# Patient Record
Sex: FEMALE | Race: WHITE | NOT HISPANIC OR LATINO | Employment: STUDENT | URBAN - METROPOLITAN AREA
[De-identification: names, ages, dates, MRNs, and addresses within clinical notes are randomized per-mention and may not be internally consistent; named-entity substitution may affect disease eponyms.]

---

## 2018-11-06 ENCOUNTER — OFFICE VISIT (OUTPATIENT)
Dept: FAMILY MEDICINE CLINIC | Facility: CLINIC | Age: 18
End: 2018-11-06
Payer: COMMERCIAL

## 2018-11-06 VITALS
OXYGEN SATURATION: 98 % | DIASTOLIC BLOOD PRESSURE: 80 MMHG | TEMPERATURE: 98 F | RESPIRATION RATE: 16 BRPM | WEIGHT: 219.6 LBS | HEART RATE: 106 BPM | SYSTOLIC BLOOD PRESSURE: 120 MMHG

## 2018-11-06 DIAGNOSIS — D50.8 OTHER IRON DEFICIENCY ANEMIA: ICD-10-CM

## 2018-11-06 DIAGNOSIS — Z30.011 ENCOUNTER FOR PRESCRIPTION OF ORAL CONTRACEPTIVES: ICD-10-CM

## 2018-11-06 DIAGNOSIS — J35.1 ENLARGED TONSILS: ICD-10-CM

## 2018-11-06 DIAGNOSIS — J45.20 MILD INTERMITTENT ASTHMA WITHOUT COMPLICATION: Primary | ICD-10-CM

## 2018-11-06 PROCEDURE — 99214 OFFICE O/P EST MOD 30 MIN: CPT | Performed by: FAMILY MEDICINE

## 2018-11-06 RX ORDER — LEVONORGESTREL AND ETHINYL ESTRADIOL 0.15-0.03
KIT ORAL EVERY 24 HOURS
COMMUNITY
Start: 2018-02-27 | End: 2018-11-06 | Stop reason: SDUPTHER

## 2018-11-06 RX ORDER — FLUTICASONE PROPIONATE 110 UG/1
2 AEROSOL, METERED RESPIRATORY (INHALATION) 2 TIMES DAILY
Qty: 1 INHALER | Refills: 3 | Status: SHIPPED | OUTPATIENT
Start: 2018-11-06 | End: 2018-12-27 | Stop reason: ALTCHOICE

## 2018-11-06 RX ORDER — BUPROPION HYDROCHLORIDE 150 MG/1
150 TABLET ORAL EVERY MORNING
Refills: 5 | COMMUNITY
Start: 2018-10-11

## 2018-11-06 RX ORDER — FLUOXETINE 10 MG/1
10 CAPSULE ORAL EVERY MORNING
Refills: 5 | COMMUNITY
Start: 2018-10-11

## 2018-11-06 RX ORDER — LEVONORGESTREL AND ETHINYL ESTRADIOL 0.15-0.03
1 KIT ORAL EVERY 24 HOURS
Qty: 28 TABLET | Refills: 5 | Status: SHIPPED | OUTPATIENT
Start: 2018-11-06

## 2018-11-06 RX ORDER — DEXTROAMPHETAMINE SACCHARATE, AMPHETAMINE ASPARTATE, DEXTROAMPHETAMINE SULFATE AND AMPHETAMINE SULFATE 7.5; 7.5; 7.5; 7.5 MG/1; MG/1; MG/1; MG/1
1 TABLET ORAL 2 TIMES DAILY
Refills: 0 | COMMUNITY
Start: 2018-10-14

## 2018-11-06 RX ORDER — FLUTICASONE PROPIONATE 50 MCG
SPRAY, SUSPENSION (ML) NASAL EVERY 12 HOURS
COMMUNITY
Start: 2018-02-27 | End: 2018-12-10 | Stop reason: SDUPTHER

## 2018-11-06 RX ORDER — LORATADINE 10 MG/1
TABLET ORAL EVERY 24 HOURS
COMMUNITY
Start: 2018-02-27 | End: 2018-11-16 | Stop reason: ALTCHOICE

## 2018-11-06 RX ORDER — NEOMYCIN SULFATE, POLYMYXIN B SULFATE AND HYDROCORTISONE 10; 3.5; 1 MG/ML; MG/ML; [USP'U]/ML
SUSPENSION/ DROPS AURICULAR (OTIC)
Refills: 0 | COMMUNITY
Start: 2018-09-13 | End: 2018-11-06

## 2018-11-06 NOTE — PROGRESS NOTES
Assessment/Plan:    No problem-specific Assessment & Plan notes found for this encounter  Diagnoses and all orders for this visit:    Mild intermittent asthma without complication  Comments:  peak flow in office was 210 L/min    start fluticasone inhaler daily, follow up in 2 weeks   continue ventolin as needed   Orders:  -     fluticasone (FLOVENT HFA) 110 MCG/ACT inhaler; Inhale 2 puffs 2 (two) times a day Rinse mouth after use  Enlarged tonsils  Comments:  extensive history of strep throat   referral to ENT to discuss possibility of tonsillectomy   Orders:  -     Ambulatory Referral to Otolaryngology; Future    Encounter for prescription of oral contraceptives  -     levonorgestrel-ethinyl estradiol (ALTAVERA) 0 15-30 MG-MCG per tablet; Take 1 tablet by mouth every 24 hours    Other orders  -     VENTOLIN  (90 Base) MCG/ACT inhaler; TAKE 2 PUFFS BY MOUTH EVERY 4 TO 6 HOURS AS NEEDED FOR BREATHING  -     Discontinue: levonorgestrel-ethinyl estradiol (ALTAVERA) 0 15-30 MG-MCG per tablet; every 24 hours  -     amphetamine-dextroamphetamine (ADDERALL) 30 MG tablet; Take 1 tablet by mouth 2 (two) times a day  -     buPROPion (WELLBUTRIN XL) 150 mg 24 hr tablet; Take 150 mg by mouth every morning  -     FLUoxetine (PROzac) 10 mg capsule; Take 10 mg by mouth every morning  -     fluticasone (FLONASE) 50 mcg/act nasal spray; Every 12 hours  -     loratadine (CLARITIN) 10 mg tablet; every 24 hours  -     Discontinue: neomycin-polymyxin-hydrocortisone (CORTISPORIN) 0 35%-10,000 units/mL-1% otic suspension; INSTILL 4 DROPS IN AFFECTED EAR(S) 4 TIMES A DAY FOR 5-7 DAYS          Subjective:      Patient ID: Percy Rodriguez is a 19 y o  female  JUMA VELIZ  Is a 18 yo F who presents to the office with complaint of "tightness when exhaling" x1 year  Patient states that she experiences a feeling of tightness in her chest with exertion and sometimes randomly at rest as well   She states this has been occurring about 3-4x/week over the last year  She was not taking anything for this until last week when she went to urgent care for bronchitis  At that time they prescribed Ventolin inhaler  She has since been using the inhaler 4x/day over the last week  She states the inhaler helps with her symptoms  She denies any PMH of asthma  Additionally, patient is asking about possibility of tonsillectomy because she has an extensive history of strep throat  She states she has had 3 documented cases of strep throat in the past year  Patient denies snoring, chest pain, dizziness, lightheadedness  The following portions of the patient's history were reviewed and updated as appropriate:   She  has a past medical history of ADHD; Asthma; and Depression  She There are no active problems to display for this patient  Current Outpatient Prescriptions   Medication Sig Dispense Refill    amphetamine-dextroamphetamine (ADDERALL) 30 MG tablet Take 1 tablet by mouth 2 (two) times a day  0    buPROPion (WELLBUTRIN XL) 150 mg 24 hr tablet Take 150 mg by mouth every morning  5    FLUoxetine (PROzac) 10 mg capsule Take 10 mg by mouth every morning  5    fluticasone (FLONASE) 50 mcg/act nasal spray Every 12 hours      levonorgestrel-ethinyl estradiol (ALTAVERA) 0 15-30 MG-MCG per tablet Take 1 tablet by mouth every 24 hours 28 tablet 5    loratadine (CLARITIN) 10 mg tablet every 24 hours      VENTOLIN  (90 Base) MCG/ACT inhaler TAKE 2 PUFFS BY MOUTH EVERY 4 TO 6 HOURS AS NEEDED FOR BREATHING  0    fluticasone (FLOVENT HFA) 110 MCG/ACT inhaler Inhale 2 puffs 2 (two) times a day Rinse mouth after use  1 Inhaler 3     No current facility-administered medications for this visit  She is allergic to no active allergies       Review of Systems   Constitutional: Negative  HENT: Negative  Eyes: Negative  Respiratory: Positive for chest tightness (with exertion )  Cardiovascular: Negative  Gastrointestinal: Negative  Genitourinary: Negative  Psychiatric/Behavioral: Negative  Objective:      /80 (BP Location: Left arm, Patient Position: Sitting, Cuff Size: Large)   Pulse (!) 106   Temp 98 °F (36 7 °C) (Tympanic)   Resp 16   Wt 99 6 kg (219 lb 9 6 oz)   SpO2 98%          Physical Exam   Constitutional: She is oriented to person, place, and time  She appears well-developed  HENT:   Head: Normocephalic  Mouth/Throat: Oropharynx is clear and moist    Enlarged tonsils bilaterally    Neck: Normal range of motion  Cardiovascular: Normal rate and regular rhythm  Pulmonary/Chest: Effort normal and breath sounds normal    Peak flow 210 L/min    Abdominal: Soft  Bowel sounds are normal    Neurological: She is alert and oriented to person, place, and time  Skin: Skin is warm  Psychiatric: She has a normal mood and affect

## 2018-11-08 ENCOUNTER — APPOINTMENT (OUTPATIENT)
Dept: LAB | Facility: IMAGING CENTER | Age: 18
End: 2018-11-08
Payer: COMMERCIAL

## 2018-11-08 ENCOUNTER — TRANSCRIBE ORDERS (OUTPATIENT)
Dept: ADMINISTRATIVE | Facility: HOSPITAL | Age: 18
End: 2018-11-08

## 2018-11-08 DIAGNOSIS — D50.8 IRON DEFICIENCY ANEMIA SECONDARY TO INADEQUATE DIETARY IRON INTAKE: ICD-10-CM

## 2018-11-08 DIAGNOSIS — Z30.011 OCP (ORAL CONTRACEPTIVE PILLS) INITIATION: ICD-10-CM

## 2018-11-08 DIAGNOSIS — Z30.011 OCP (ORAL CONTRACEPTIVE PILLS) INITIATION: Primary | ICD-10-CM

## 2018-11-08 LAB
ALBUMIN SERPL BCP-MCNC: 3.6 G/DL (ref 3.5–5)
ALP SERPL-CCNC: 88 U/L (ref 46–116)
ALT SERPL W P-5'-P-CCNC: 18 U/L (ref 12–78)
ANION GAP SERPL CALCULATED.3IONS-SCNC: 4 MMOL/L (ref 4–13)
AST SERPL W P-5'-P-CCNC: 11 U/L (ref 5–45)
BASOPHILS # BLD AUTO: 0.03 THOUSANDS/ΜL (ref 0–0.1)
BASOPHILS NFR BLD AUTO: 0 % (ref 0–1)
BILIRUB SERPL-MCNC: 0.42 MG/DL (ref 0.2–1)
BUN SERPL-MCNC: 15 MG/DL (ref 5–25)
CALCIUM SERPL-MCNC: 9.1 MG/DL (ref 8.3–10.1)
CHLORIDE SERPL-SCNC: 105 MMOL/L (ref 100–108)
CO2 SERPL-SCNC: 27 MMOL/L (ref 21–32)
CREAT SERPL-MCNC: 0.88 MG/DL (ref 0.6–1.3)
EOSINOPHIL # BLD AUTO: 0.09 THOUSAND/ΜL (ref 0–0.61)
EOSINOPHIL NFR BLD AUTO: 1 % (ref 0–6)
ERYTHROCYTE [DISTWIDTH] IN BLOOD BY AUTOMATED COUNT: 12.2 % (ref 11.6–15.1)
GFR SERPL CREATININE-BSD FRML MDRD: 95 ML/MIN/1.73SQ M
GLUCOSE P FAST SERPL-MCNC: 85 MG/DL (ref 65–99)
HCT VFR BLD AUTO: 40.7 % (ref 34.8–46.1)
HGB BLD-MCNC: 12.9 G/DL (ref 11.5–15.4)
IMM GRANULOCYTES # BLD AUTO: 0.06 THOUSAND/UL (ref 0–0.2)
IMM GRANULOCYTES NFR BLD AUTO: 1 % (ref 0–2)
LYMPHOCYTES # BLD AUTO: 2.47 THOUSANDS/ΜL (ref 0.6–4.47)
LYMPHOCYTES NFR BLD AUTO: 30 % (ref 14–44)
MCH RBC QN AUTO: 28.5 PG (ref 26.8–34.3)
MCHC RBC AUTO-ENTMCNC: 31.7 G/DL (ref 31.4–37.4)
MCV RBC AUTO: 90 FL (ref 82–98)
MONOCYTES # BLD AUTO: 0.49 THOUSAND/ΜL (ref 0.17–1.22)
MONOCYTES NFR BLD AUTO: 6 % (ref 4–12)
NEUTROPHILS # BLD AUTO: 5.1 THOUSANDS/ΜL (ref 1.85–7.62)
NEUTS SEG NFR BLD AUTO: 62 % (ref 43–75)
NRBC BLD AUTO-RTO: 0 /100 WBCS
PLATELET # BLD AUTO: 405 THOUSANDS/UL (ref 149–390)
PMV BLD AUTO: 10.5 FL (ref 8.9–12.7)
POTASSIUM SERPL-SCNC: 4.4 MMOL/L (ref 3.5–5.3)
PROT SERPL-MCNC: 7.9 G/DL (ref 6.4–8.2)
RBC # BLD AUTO: 4.52 MILLION/UL (ref 3.81–5.12)
SODIUM SERPL-SCNC: 136 MMOL/L (ref 136–145)
TSH SERPL DL<=0.05 MIU/L-ACNC: 2.26 UIU/ML (ref 0.46–3.98)
WBC # BLD AUTO: 8.24 THOUSAND/UL (ref 4.31–10.16)

## 2018-11-08 PROCEDURE — 80053 COMPREHEN METABOLIC PANEL: CPT

## 2018-11-08 PROCEDURE — 84443 ASSAY THYROID STIM HORMONE: CPT

## 2018-11-08 PROCEDURE — 85025 COMPLETE CBC W/AUTO DIFF WBC: CPT

## 2018-11-08 PROCEDURE — 36415 COLL VENOUS BLD VENIPUNCTURE: CPT

## 2018-11-16 ENCOUNTER — HOSPITAL ENCOUNTER (OUTPATIENT)
Dept: RADIOLOGY | Facility: IMAGING CENTER | Age: 18
Discharge: HOME | End: 2018-11-16
Payer: COMMERCIAL

## 2018-11-16 ENCOUNTER — TRANSCRIBE ORDERS (OUTPATIENT)
Dept: ADMINISTRATIVE | Facility: HOSPITAL | Age: 18
End: 2018-11-16

## 2018-11-16 ENCOUNTER — OFFICE VISIT (OUTPATIENT)
Dept: FAMILY MEDICINE CLINIC | Facility: CLINIC | Age: 18
End: 2018-11-16
Payer: COMMERCIAL

## 2018-11-16 VITALS
DIASTOLIC BLOOD PRESSURE: 70 MMHG | SYSTOLIC BLOOD PRESSURE: 120 MMHG | HEIGHT: 65 IN | TEMPERATURE: 98.9 F | BODY MASS INDEX: 37.95 KG/M2 | OXYGEN SATURATION: 97 % | RESPIRATION RATE: 16 BRPM | WEIGHT: 227.8 LBS | HEART RATE: 100 BPM

## 2018-11-16 DIAGNOSIS — M54.6 ACUTE LEFT-SIDED THORACIC BACK PAIN: ICD-10-CM

## 2018-11-16 DIAGNOSIS — M54.6 ACUTE LEFT-SIDED THORACIC BACK PAIN: Primary | ICD-10-CM

## 2018-11-16 PROCEDURE — 72072 X-RAY EXAM THORAC SPINE 3VWS: CPT

## 2018-11-16 PROCEDURE — 3008F BODY MASS INDEX DOCD: CPT | Performed by: FAMILY MEDICINE

## 2018-11-16 PROCEDURE — 99214 OFFICE O/P EST MOD 30 MIN: CPT | Performed by: FAMILY MEDICINE

## 2018-11-16 RX ORDER — CEPHALEXIN 500 MG/1
TABLET ORAL EVERY 6 HOURS
COMMUNITY
Start: 2018-02-27 | End: 2018-11-16 | Stop reason: ALTCHOICE

## 2018-11-16 RX ORDER — NORETHINDRONE ACETATE AND ETHINYL ESTRADIOL .03; 1.5 MG/1; MG/1
1 TABLET ORAL
COMMUNITY
Start: 2015-12-07 | End: 2018-11-16 | Stop reason: ALTCHOICE

## 2018-11-16 RX ORDER — NAPROXEN 500 MG/1
500 TABLET ORAL 2 TIMES DAILY WITH MEALS
Qty: 60 TABLET | Refills: 0 | Status: SHIPPED | OUTPATIENT
Start: 2018-11-16 | End: 2018-12-27 | Stop reason: ALTCHOICE

## 2018-11-16 RX ORDER — CLINDAMYCIN AND BENZOYL PEROXIDE 10; 50 MG/G; MG/G
GEL TOPICAL
COMMUNITY
Start: 2015-08-13 | End: 2018-11-16 | Stop reason: ALTCHOICE

## 2018-11-16 RX ORDER — BACLOFEN 10 MG/1
10 TABLET ORAL 3 TIMES DAILY
Qty: 30 TABLET | Refills: 0 | Status: SHIPPED | OUTPATIENT
Start: 2018-11-16 | End: 2018-12-10 | Stop reason: ALTCHOICE

## 2018-11-16 NOTE — PROGRESS NOTES
Assessment/Plan:     Diagnoses and all orders for this visit:    Acute left-sided thoracic back pain  Comments:  She was given prescription for naproxen and baclofen  I am going to check x-ray  Orders:  -     XR spine thoracic 3 vw; Future  -     naproxen (NAPROSYN) 500 mg tablet; Take 1 tablet (500 mg total) by mouth 2 (two) times a day with meals  -     baclofen 10 mg tablet; Take 1 tablet (10 mg total) by mouth 3 (three) times a day    Other orders  -     Discontinue: Cephalexin 500 MG tablet; every 6 (six) hours  -     Discontinue: clindamycin-benzoyl peroxide (BENZACLIN) gel; Apply topically  -     Discontinue: Norethindrone Acet-Ethinyl Est 1 5-30 MG-MCG TABS; Take 1 tablet by mouth          There are no Patient Instructions on file for this visit  Return if symptoms worsen or fail to improve  Subjective:      Patient ID: Juanice Holstein is a 19 y o  female  Chief Complaint   Patient presents with    Back Pain     left middle back pain       Back Pain   This is a new problem  The current episode started today  The problem occurs constantly  The problem has been gradually worsening since onset  The pain is present in the thoracic spine  The quality of the pain is described as stabbing  The pain does not radiate  The pain is at a severity of 6/10  The pain is the same all the time  The symptoms are aggravated by bending, position and twisting  Pertinent negatives include no abdominal pain, chest pain, dysuria, fever, headaches, leg pain, tingling or weakness  Risk factors include obesity and lack of exercise  The following portions of the patient's history were reviewed and updated as appropriate: allergies, current medications, past family history, past medical history, past social history, past surgical history and problem list     Review of Systems   Constitutional: Negative for chills and fever  HENT: Negative for trouble swallowing  Eyes: Negative for visual disturbance     Respiratory: Negative for cough and shortness of breath  Cardiovascular: Negative for chest pain, palpitations and leg swelling  Gastrointestinal: Negative for abdominal pain, constipation and diarrhea  Endocrine: Negative for cold intolerance and heat intolerance  Genitourinary: Negative for difficulty urinating and dysuria  Musculoskeletal: Positive for back pain  Negative for gait problem  Skin: Negative for rash  Neurological: Negative for dizziness, tingling, tremors, seizures, weakness and headaches  Hematological: Negative for adenopathy  Psychiatric/Behavioral: Negative for behavioral problems  Current Outpatient Prescriptions   Medication Sig Dispense Refill    amphetamine-dextroamphetamine (ADDERALL) 30 MG tablet Take 1 tablet by mouth 2 (two) times a day  0    buPROPion (WELLBUTRIN XL) 150 mg 24 hr tablet Take 150 mg by mouth every morning  5    FLUoxetine (PROzac) 10 mg capsule Take 10 mg by mouth every morning  5    fluticasone (FLONASE) 50 mcg/act nasal spray Every 12 hours      fluticasone (FLOVENT HFA) 110 MCG/ACT inhaler Inhale 2 puffs 2 (two) times a day Rinse mouth after use  1 Inhaler 3    levonorgestrel-ethinyl estradiol (ALTAVERA) 0 15-30 MG-MCG per tablet Take 1 tablet by mouth every 24 hours 28 tablet 5    VENTOLIN  (90 Base) MCG/ACT inhaler TAKE 2 PUFFS BY MOUTH EVERY 4 TO 6 HOURS AS NEEDED FOR BREATHING  0    baclofen 10 mg tablet Take 1 tablet (10 mg total) by mouth 3 (three) times a day 30 tablet 0    naproxen (NAPROSYN) 500 mg tablet Take 1 tablet (500 mg total) by mouth 2 (two) times a day with meals 60 tablet 0     No current facility-administered medications for this visit          Objective:    /70 (BP Location: Left arm, Patient Position: Sitting, Cuff Size: Large)   Pulse 100   Temp 98 9 °F (37 2 °C) (Tympanic)   Resp 16   Ht 5' 5" (1 651 m)   Wt 103 kg (227 lb 12 8 oz)   SpO2 97%   BMI 37 91 kg/m²        Physical Exam   Constitutional: She is oriented to person, place, and time  She appears well-developed and well-nourished  HENT:   Head: Normocephalic and atraumatic  Eyes: Pupils are equal, round, and reactive to light  EOM are normal    Neck: Normal range of motion  Neck supple  Cardiovascular: Normal rate, regular rhythm and normal heart sounds  Pulmonary/Chest: Effort normal and breath sounds normal    Abdominal: Soft  Bowel sounds are normal    Musculoskeletal: Normal range of motion  She exhibits tenderness (Tender to palpation over the left paraspinal acne and thoracic region  Range of motion limited due to pain  )  She exhibits no edema  Lymphadenopathy:     She has no cervical adenopathy  Neurological: She is alert and oriented to person, place, and time  No cranial nerve deficit  Skin: Skin is warm  Psychiatric: She has a normal mood and affect  Nursing note and vitals reviewed               Devaughn Rodgers MD

## 2018-11-20 ENCOUNTER — OFFICE VISIT (OUTPATIENT)
Dept: FAMILY MEDICINE CLINIC | Facility: CLINIC | Age: 18
End: 2018-11-20
Payer: COMMERCIAL

## 2018-11-20 VITALS
RESPIRATION RATE: 16 BRPM | HEART RATE: 108 BPM | HEIGHT: 65 IN | TEMPERATURE: 98.9 F | SYSTOLIC BLOOD PRESSURE: 132 MMHG | OXYGEN SATURATION: 98 % | DIASTOLIC BLOOD PRESSURE: 76 MMHG | WEIGHT: 227 LBS | BODY MASS INDEX: 37.82 KG/M2

## 2018-11-20 DIAGNOSIS — J35.1 ENLARGED TONSILS: ICD-10-CM

## 2018-11-20 DIAGNOSIS — J45.20 MILD INTERMITTENT ASTHMA WITHOUT COMPLICATION: Primary | ICD-10-CM

## 2018-11-20 PROCEDURE — 99214 OFFICE O/P EST MOD 30 MIN: CPT | Performed by: FAMILY MEDICINE

## 2018-11-20 PROCEDURE — 1036F TOBACCO NON-USER: CPT | Performed by: FAMILY MEDICINE

## 2018-11-20 NOTE — PROGRESS NOTES
Assessment/Plan:    No problem-specific Assessment & Plan notes found for this encounter  Diagnoses and all orders for this visit:    Mild intermittent asthma without complication  Comments:  patient is better but still using the inhaler 3-4 times in a week  Will sent the patient for PFT    Orders:  -     Spirometry pre and post bronchodilator; Future    Enlarged tonsils  Comments:  Sent to the ENT for further eval  Orders:  -     Ambulatory Referral to Otolaryngology; Future          Subjective:      Patient ID: Akanksha Mas is a 19 y o  female  HPI   Patient is here for a follow up, still taking Flonase and ventolin inhaler 3-4 times in a week  Patient also wants to get her tonsils removed, as multiple history of strept in the past   Patient stated that Flovent has helped but not completley , will do the PFT and follow    The following portions of the patient's history were reviewed and updated as appropriate: She  has a past medical history of ADHD; Asthma; and Depression  She is allergic to no active allergies  Review of Systems   Constitutional: Negative  HENT: Negative  Eyes: Negative  Respiratory: Negative  Cardiovascular: Negative  Gastrointestinal: Negative  Genitourinary: Negative  Psychiatric/Behavioral: Negative  Objective:      /76 (BP Location: Left arm, Patient Position: Sitting, Cuff Size: Large)   Pulse (!) 108   Temp 98 9 °F (37 2 °C) (Tympanic)   Resp 16   Ht 5' 5" (1 651 m)   Wt 103 kg (227 lb)   SpO2 98%   BMI 37 77 kg/m²          Physical Exam   Constitutional: She is oriented to person, place, and time  She appears well-developed  HENT:   Head: Normocephalic  Mouth/Throat: Oropharynx is clear and moist    Neck: Normal range of motion  Cardiovascular: Normal rate and regular rhythm  Pulmonary/Chest: Effort normal and breath sounds normal    Abdominal: Soft   Bowel sounds are normal    Neurological: She is alert and oriented to person, place, and time  Skin: Skin is warm  Psychiatric: She has a normal mood and affect

## 2018-12-03 ENCOUNTER — TELEPHONE (OUTPATIENT)
Dept: FAMILY MEDICINE CLINIC | Facility: CLINIC | Age: 18
End: 2018-12-03

## 2018-12-03 DIAGNOSIS — T78.40XS ALLERGIC STATE, SEQUELA: Primary | ICD-10-CM

## 2018-12-03 RX ORDER — FLUTICASONE PROPIONATE 50 MCG
2 SPRAY, SUSPENSION (ML) NASAL
Qty: 16 G | Refills: 2 | Status: CANCELLED | OUTPATIENT
Start: 2018-12-03

## 2018-12-10 ENCOUNTER — TRANSCRIBE ORDERS (OUTPATIENT)
Dept: ADMINISTRATIVE | Facility: HOSPITAL | Age: 18
End: 2018-12-10

## 2018-12-10 ENCOUNTER — OFFICE VISIT (OUTPATIENT)
Dept: FAMILY MEDICINE CLINIC | Facility: CLINIC | Age: 18
End: 2018-12-10
Payer: COMMERCIAL

## 2018-12-10 ENCOUNTER — HOSPITAL ENCOUNTER (OUTPATIENT)
Dept: RADIOLOGY | Facility: IMAGING CENTER | Age: 18
Discharge: HOME | End: 2018-12-10
Payer: COMMERCIAL

## 2018-12-10 VITALS
SYSTOLIC BLOOD PRESSURE: 120 MMHG | TEMPERATURE: 98 F | WEIGHT: 226.8 LBS | RESPIRATION RATE: 16 BRPM | HEIGHT: 65 IN | HEART RATE: 85 BPM | BODY MASS INDEX: 37.79 KG/M2 | OXYGEN SATURATION: 97 % | DIASTOLIC BLOOD PRESSURE: 82 MMHG

## 2018-12-10 DIAGNOSIS — J01.01 ACUTE RECURRENT MAXILLARY SINUSITIS: Primary | ICD-10-CM

## 2018-12-10 DIAGNOSIS — J45.30 MILD PERSISTENT ASTHMA WITHOUT COMPLICATION: ICD-10-CM

## 2018-12-10 DIAGNOSIS — J42 CHRONIC BRONCHITIS, UNSPECIFIED CHRONIC BRONCHITIS TYPE (HCC): ICD-10-CM

## 2018-12-10 DIAGNOSIS — J42 CHRONIC BRONCHITIS, UNSPECIFIED CHRONIC BRONCHITIS TYPE (HCC): Primary | ICD-10-CM

## 2018-12-10 DIAGNOSIS — R53.82 CHRONIC FATIGUE: ICD-10-CM

## 2018-12-10 PROCEDURE — 3008F BODY MASS INDEX DOCD: CPT | Performed by: FAMILY MEDICINE

## 2018-12-10 PROCEDURE — 99214 OFFICE O/P EST MOD 30 MIN: CPT | Performed by: FAMILY MEDICINE

## 2018-12-10 PROCEDURE — 1036F TOBACCO NON-USER: CPT | Performed by: FAMILY MEDICINE

## 2018-12-10 PROCEDURE — 71046 X-RAY EXAM CHEST 2 VIEWS: CPT

## 2018-12-10 RX ORDER — GUAIFENESIN AND CODEINE PHOSPHATE 100; 10 MG/5ML; MG/5ML
5 SOLUTION ORAL 3 TIMES DAILY PRN
Qty: 120 ML | Refills: 0 | Status: SHIPPED | OUTPATIENT
Start: 2018-12-10

## 2018-12-10 RX ORDER — AMOXICILLIN 875 MG/1
875 TABLET, COATED ORAL 2 TIMES DAILY
Refills: 0 | COMMUNITY
Start: 2018-11-28 | End: 2018-12-10 | Stop reason: ALTCHOICE

## 2018-12-10 RX ORDER — CEPHALEXIN 500 MG/1
500 CAPSULE ORAL EVERY 8 HOURS SCHEDULED
Qty: 21 CAPSULE | Refills: 0 | Status: SHIPPED | OUTPATIENT
Start: 2018-12-10 | End: 2018-12-17

## 2018-12-10 RX ORDER — FLUTICASONE PROPIONATE 50 MCG
2 SPRAY, SUSPENSION (ML) NASAL DAILY
Qty: 16 G | Refills: 3 | Status: SHIPPED | OUTPATIENT
Start: 2018-12-10

## 2018-12-10 NOTE — LETTER
December 10, 2018     Patient: Diane Zhou   YOB: 2000   Date of Visit: 12/10/2018       To Whom it May Concern:    Diane Zhou is under my professional care  She was seen in my office on 12/10/2018  She is medically excused from school on 12/06/2018  If you have any questions or concerns, please don't hesitate to call           Sincerely,          Malena Faulkner MD        CC: No Recipients

## 2018-12-10 NOTE — PROGRESS NOTES
Assessment/Plan:     Diagnoses and all orders for this visit:    Acute recurrent maxillary sinusitis  Comments:  Prescribed Keflex 500mg x7days as well as Flonase to drain middle ears  Prescribed guaifenesin-codeine syrup for cough  Continue fluids  Orders:  -     cephalexin (KEFLEX) 500 mg capsule; Take 1 capsule (500 mg total) by mouth every 8 (eight) hours for 7 days  -     fluticasone (FLONASE) 50 mcg/act nasal spray; 2 sprays into each nostril daily  -     guaifenesin-codeine (GUAIFENESIN AC) 100-10 MG/5ML liquid; Take 5 mL by mouth 3 (three) times a day as needed for cough    Mild persistent asthma without complication  Comments:  She is going to see pulmonologist for a pulmonary function test   I will consider singular  Chronic fatigue  Comments:  She is to come back in 2 weeks and I will consider blood work and referral to get a sleep study  Other orders  -     Discontinue: amoxicillin (AMOXIL) 875 mg tablet; Take 875 mg by mouth 2 (two) times a day          There are no Patient Instructions on file for this visit  Return in about 2 weeks (around 12/24/2018)  Subjective:      Patient ID: Kate Lozada is a 19 y o  female  Chief Complaint   Patient presents with    Cold Like Symptoms     left ear pain, chest and nasal congestion    Menstrual Problem     cramping, abnormal bleeding       Marianna Du is a 19year old female presenting today complaining of head congestion, sinus pressure, and ear pain x 4 days  Patient reports last week she was fighting bronchitis but now her breathing has improved but congestion has travelled to head  She has not measured any temperatures at home  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  She denies any SOB, or difficulty breathing now  She has some decreased appetite but able to keep water and food down without any problems  She has been alternating tylenol and advil with relief          The following portions of the patient's history were reviewed and updated as appropriate: allergies, current medications, past family history, past medical history, past social history, past surgical history and problem list     Review of Systems   Constitutional: Negative for chills and fever  HENT: Positive for congestion, ear pain, sinus pressure and sore throat  Negative for trouble swallowing  Eyes: Negative for visual disturbance  Respiratory: Negative for cough and shortness of breath  Cardiovascular: Negative for chest pain, palpitations and leg swelling  Gastrointestinal: Negative for abdominal pain, constipation and diarrhea  Endocrine: Negative for cold intolerance and heat intolerance  Genitourinary: Negative for difficulty urinating and dysuria  Musculoskeletal: Negative for gait problem  Skin: Negative for rash  Neurological: Negative for dizziness, tremors, seizures and headaches  Hematological: Negative for adenopathy  Psychiatric/Behavioral: Negative for behavioral problems  Current Outpatient Prescriptions   Medication Sig Dispense Refill    amphetamine-dextroamphetamine (ADDERALL) 30 MG tablet Take 1 tablet by mouth 2 (two) times a day  0    buPROPion (WELLBUTRIN XL) 150 mg 24 hr tablet Take 150 mg by mouth every morning  5    FLUoxetine (PROzac) 10 mg capsule Take 10 mg by mouth every morning  5    fluticasone (FLONASE) 50 mcg/act nasal spray 2 sprays into each nostril daily 16 g 3    fluticasone (FLOVENT HFA) 110 MCG/ACT inhaler Inhale 2 puffs 2 (two) times a day Rinse mouth after use   1 Inhaler 3    VENTOLIN  (90 Base) MCG/ACT inhaler TAKE 2 PUFFS BY MOUTH EVERY 4 TO 6 HOURS AS NEEDED FOR BREATHING  0    cephalexin (KEFLEX) 500 mg capsule Take 1 capsule (500 mg total) by mouth every 8 (eight) hours for 7 days 21 capsule 0    guaifenesin-codeine (GUAIFENESIN AC) 100-10 MG/5ML liquid Take 5 mL by mouth 3 (three) times a day as needed for cough 120 mL 0    levonorgestrel-ethinyl estradiol (ALTAVERA) 0 15-30 MG-MCG per tablet Take 1 tablet by mouth every 24 hours (Patient not taking: Reported on 12/10/2018 ) 28 tablet 5    naproxen (NAPROSYN) 500 mg tablet Take 1 tablet (500 mg total) by mouth 2 (two) times a day with meals (Patient not taking: Reported on 12/10/2018 ) 60 tablet 0     No current facility-administered medications for this visit  Objective:    /82 (BP Location: Left arm, Patient Position: Sitting, Cuff Size: Large)   Pulse 85   Temp 98 °F (36 7 °C) (Tympanic)   Resp 16   Ht 5' 5" (1 651 m)   Wt 103 kg (226 lb 12 8 oz)   SpO2 97%   BMI 37 74 kg/m²        Physical Exam   Constitutional: She is oriented to person, place, and time  She appears well-developed and well-nourished  HENT:   Head: Normocephalic and atraumatic  Right Ear: A middle ear effusion is present  Left Ear: A middle ear effusion is present  Mouth/Throat: Posterior oropharyngeal erythema present  Eyes: Pupils are equal, round, and reactive to light  EOM are normal    Neck: Normal range of motion  Neck supple  Cardiovascular: Normal rate, regular rhythm and normal heart sounds  Pulmonary/Chest: Effort normal and breath sounds normal    Abdominal: Soft  Bowel sounds are normal    Musculoskeletal: Normal range of motion  She exhibits no edema  Lymphadenopathy:     She has no cervical adenopathy  Neurological: She is alert and oriented to person, place, and time  No cranial nerve deficit  Skin: Skin is warm  Psychiatric: She has a normal mood and affect  Nursing note and vitals reviewed               Tamia Loomis MD

## 2018-12-27 ENCOUNTER — OFFICE VISIT (OUTPATIENT)
Dept: FAMILY MEDICINE CLINIC | Facility: CLINIC | Age: 18
End: 2018-12-27
Payer: COMMERCIAL

## 2018-12-27 VITALS
SYSTOLIC BLOOD PRESSURE: 110 MMHG | TEMPERATURE: 97.5 F | WEIGHT: 233.8 LBS | DIASTOLIC BLOOD PRESSURE: 80 MMHG | RESPIRATION RATE: 16 BRPM | HEART RATE: 85 BPM | HEIGHT: 65 IN | BODY MASS INDEX: 38.95 KG/M2 | OXYGEN SATURATION: 98 %

## 2018-12-27 DIAGNOSIS — Z23 IMMUNIZATION DUE: ICD-10-CM

## 2018-12-27 DIAGNOSIS — L30.9 DERMATITIS: ICD-10-CM

## 2018-12-27 DIAGNOSIS — J45.30 MILD PERSISTENT ASTHMA WITHOUT COMPLICATION: ICD-10-CM

## 2018-12-27 DIAGNOSIS — Z00.00 HEALTHCARE MAINTENANCE: Primary | ICD-10-CM

## 2018-12-27 DIAGNOSIS — G47.33 OBSTRUCTIVE SLEEP APNEA SYNDROME: ICD-10-CM

## 2018-12-27 PROBLEM — R06.2 WHEEZING: Status: ACTIVE | Noted: 2018-12-13

## 2018-12-27 PROCEDURE — 90686 IIV4 VACC NO PRSV 0.5 ML IM: CPT

## 2018-12-27 PROCEDURE — 99395 PREV VISIT EST AGE 18-39: CPT | Performed by: FAMILY MEDICINE

## 2018-12-27 PROCEDURE — 90471 IMMUNIZATION ADMIN: CPT

## 2018-12-27 RX ORDER — MONTELUKAST SODIUM 10 MG/1
10 TABLET ORAL
Qty: 30 TABLET | Refills: 5 | Status: SHIPPED | OUTPATIENT
Start: 2018-12-27 | End: 2019-06-24 | Stop reason: SDUPTHER

## 2018-12-27 RX ORDER — TRIAMCINOLONE ACETONIDE 5 MG/G
CREAM TOPICAL 3 TIMES DAILY
Qty: 30 G | Refills: 3 | Status: SHIPPED | OUTPATIENT
Start: 2018-12-27

## 2018-12-27 NOTE — PROGRESS NOTES
Assessment/Plan:     Diagnoses and all orders for this visit:    Healthcare maintenance  Comments: It was discussed about immunizations  It was discussed about diet, exercise and safety measures  Immunization due  -     SYRINGE/SINGLE-DOSE VIAL: influenza vaccine, [id number], quadrivalent, 0 5 mL, preservative-free, for patients 3+ yr (FLUZONE)    Mild persistent asthma without complication  Comments:    Improved  Continue same  Will continue to monitor  Orders:  -     montelukast (SINGULAIR) 10 mg tablet; Take 1 tablet (10 mg total) by mouth daily at bedtime    Dermatitis  Comments:   was given a script for triamcinolone cream   Orders:  -     triamcinolone (KENALOG) 0 5 % cream; Apply topically 3 (three) times a day    Obstructive sleep apnea syndrome  Comments:    Referral to see the specialist   Orders:  -     Ambulatory referral to Sleep Medicine; Future    Other orders  -     mometasone-formoterol (DULERA) 200-5 MCG/ACT inhaler; Inhale 2 puffs 2 (two) times a day Rinse mouth after use  There are no Patient Instructions on file for this visit  Return in about 3 months (around 3/27/2019)  Subjective:      Patient ID: Yris Santos is a 19 y o  female  Chief Complaint   Patient presents with    Annual Exam        She is here today for wellness exam   She was seen by pulmonologist and started on the [facility] inhaler for her asthma  She was also told that she needs to be checked for sleep apnea  He she was witnessed stop breathing at night while she is sleeping  She snores and she has fatigue and tired during the day  The following portions of the patient's history were reviewed and updated as appropriate: allergies, current medications, past family history, past medical history, past social history, past surgical history and problem list     Review of Systems   Constitutional: Positive for fatigue  Negative for chills and fever  HENT: Negative for trouble swallowing      Eyes: Negative for visual disturbance  Respiratory: Negative for cough and shortness of breath  Cardiovascular: Negative for chest pain, palpitations and leg swelling  Gastrointestinal: Negative for abdominal pain, constipation and diarrhea  Endocrine: Negative for cold intolerance and heat intolerance  Genitourinary: Negative for difficulty urinating and dysuria  Musculoskeletal: Negative for gait problem  Skin: Negative for rash  Neurological: Negative for dizziness, tremors, seizures and headaches  Hematological: Negative for adenopathy  Psychiatric/Behavioral: Positive for sleep disturbance  Negative for behavioral problems  Current Outpatient Prescriptions   Medication Sig Dispense Refill    amphetamine-dextroamphetamine (ADDERALL) 30 MG tablet Take 1 tablet by mouth 2 (two) times a day  0    buPROPion (WELLBUTRIN XL) 150 mg 24 hr tablet Take 150 mg by mouth every morning  5    FLUoxetine (PROzac) 10 mg capsule Take 10 mg by mouth every morning  5    fluticasone (FLONASE) 50 mcg/act nasal spray 2 sprays into each nostril daily 16 g 3    guaifenesin-codeine (GUAIFENESIN AC) 100-10 MG/5ML liquid Take 5 mL by mouth 3 (three) times a day as needed for cough 120 mL 0    levonorgestrel-ethinyl estradiol (ALTAVERA) 0 15-30 MG-MCG per tablet Take 1 tablet by mouth every 24 hours 28 tablet 5    mometasone-formoterol (DULERA) 200-5 MCG/ACT inhaler Inhale 2 puffs 2 (two) times a day Rinse mouth after use   VENTOLIN  (90 Base) MCG/ACT inhaler TAKE 2 PUFFS BY MOUTH EVERY 4 TO 6 HOURS AS NEEDED FOR BREATHING  0    montelukast (SINGULAIR) 10 mg tablet Take 1 tablet (10 mg total) by mouth daily at bedtime 30 tablet 5    triamcinolone (KENALOG) 0 5 % cream Apply topically 3 (three) times a day 30 g 3     No current facility-administered medications for this visit          Objective:    /80 (BP Location: Left arm, Patient Position: Sitting, Cuff Size: Large)   Pulse 85   Temp 97 5 °F (36 4 °C) (Tympanic)   Resp 16   Ht 5' 5" (1 651 m)   Wt 106 kg (233 lb 12 8 oz)   SpO2 98%   BMI 38 91 kg/m²        Physical Exam   Constitutional: She is oriented to person, place, and time  She appears well-developed and well-nourished  HENT:   Head: Normocephalic and atraumatic  Eyes: Pupils are equal, round, and reactive to light  EOM are normal    Neck: Normal range of motion  Neck supple  Cardiovascular: Normal rate, regular rhythm and normal heart sounds  Pulmonary/Chest: Effort normal and breath sounds normal    Abdominal: Soft  Bowel sounds are normal    Musculoskeletal: Normal range of motion  She exhibits no edema  Lymphadenopathy:     She has no cervical adenopathy  Neurological: She is alert and oriented to person, place, and time  No cranial nerve deficit  Skin: Skin is warm  Psychiatric: She has a normal mood and affect  Nursing note and vitals reviewed               Annalee Dorsey MD

## 2019-01-17 ENCOUNTER — TELEPHONE (OUTPATIENT)
Dept: FAMILY MEDICINE CLINIC | Facility: CLINIC | Age: 19
End: 2019-01-17

## 2019-01-31 ENCOUNTER — TELEPHONE (OUTPATIENT)
Dept: FAMILY MEDICINE CLINIC | Facility: CLINIC | Age: 19
End: 2019-01-31

## 2019-02-06 ENCOUNTER — TRANSCRIBE ORDERS (OUTPATIENT)
Dept: ADMINISTRATIVE | Facility: HOSPITAL | Age: 19
End: 2019-02-06

## 2019-06-24 DIAGNOSIS — J45.30 MILD PERSISTENT ASTHMA WITHOUT COMPLICATION: ICD-10-CM

## 2019-06-29 RX ORDER — MONTELUKAST SODIUM 10 MG/1
10 TABLET ORAL
Qty: 30 TABLET | Refills: 5 | Status: SHIPPED | OUTPATIENT
Start: 2019-06-29

## 2024-02-14 ENCOUNTER — OFFICE VISIT (OUTPATIENT)
Dept: OBGYN CLINIC | Facility: MEDICAL CENTER | Age: 24
End: 2024-02-14
Payer: COMMERCIAL

## 2024-02-14 VITALS
SYSTOLIC BLOOD PRESSURE: 118 MMHG | BODY MASS INDEX: 44.83 KG/M2 | DIASTOLIC BLOOD PRESSURE: 75 MMHG | WEIGHT: 269.1 LBS | HEIGHT: 65 IN

## 2024-02-14 DIAGNOSIS — Z01.419 ENCOUNTER FOR ROUTINE GYNECOLOGICAL EXAMINATION WITH PAPANICOLAOU SMEAR OF CERVIX: Primary | ICD-10-CM

## 2024-02-14 DIAGNOSIS — Z11.3 SCREENING FOR STD (SEXUALLY TRANSMITTED DISEASE): ICD-10-CM

## 2024-02-14 PROCEDURE — 87491 CHLMYD TRACH DNA AMP PROBE: CPT | Performed by: OBSTETRICS & GYNECOLOGY

## 2024-02-14 PROCEDURE — G0476 HPV COMBO ASSAY CA SCREEN: HCPCS | Performed by: OBSTETRICS & GYNECOLOGY

## 2024-02-14 PROCEDURE — G0145 SCR C/V CYTO,THINLAYER,RESCR: HCPCS | Performed by: OBSTETRICS & GYNECOLOGY

## 2024-02-14 PROCEDURE — 87591 N.GONORRHOEAE DNA AMP PROB: CPT | Performed by: OBSTETRICS & GYNECOLOGY

## 2024-02-14 PROCEDURE — 99385 PREV VISIT NEW AGE 18-39: CPT | Performed by: OBSTETRICS & GYNECOLOGY

## 2024-02-14 RX ORDER — VITAMIN E (DL,TOCOPHERYL ACET) 180 MG
CAPSULE ORAL
COMMUNITY

## 2024-02-14 RX ORDER — PHENOL 1.4 %
AEROSOL, SPRAY (ML) MUCOUS MEMBRANE
COMMUNITY

## 2024-02-14 RX ORDER — TIRZEPATIDE 5 MG/.5ML
INJECTION, SOLUTION SUBCUTANEOUS
COMMUNITY
Start: 2023-11-14 | End: 2024-02-14 | Stop reason: ALTCHOICE

## 2024-02-14 RX ORDER — TIRZEPATIDE 7.5 MG/.5ML
INJECTION, SOLUTION SUBCUTANEOUS
COMMUNITY

## 2024-02-14 RX ORDER — DIPHENOXYLATE HYDROCHLORIDE AND ATROPINE SULFATE 2.5; .025 MG/1; MG/1
1 TABLET ORAL EVERY MORNING
COMMUNITY
End: 2024-02-14 | Stop reason: ALTCHOICE

## 2024-02-14 RX ORDER — METFORMIN HYDROCHLORIDE 500 MG/1
TABLET, EXTENDED RELEASE ORAL
COMMUNITY
Start: 2024-01-29

## 2024-02-14 NOTE — PATIENT INSTRUCTIONS
Thank you for your confidence in our team.   We appreciate you and welcome your feedback.   If you receive a survey from us, please take a few moments to let us know how we are doing.   Sincerely,   Eloisa Fajardo MD

## 2024-02-14 NOTE — PROGRESS NOTES
ASSESSMENT & PLAN: Diagnoses and all orders for this visit:    Encounter for routine gynecological examination with Papanicolaou smear of cervix  -     Liquid-based pap, screening    Screening for STD (sexually transmitted disease)  -     Chlamydia/GC amplified DNA by PCR    Other orders  -     Melatonin 10 MG TABS  -     Discontinue: multivitamin (THERAGRAN) TABS; Take 1 tablet by mouth every morning (Patient not taking: Reported on 2/14/2024)  -     Biotin w/ Vitamins C & E (Hair/Skin/Nails) 1250-7.5-7.5 MCG-MG-UNT CHEW  -     Discontinue: Multiple Vitamins-Minerals (Emergen-C Five) PACK; Take by mouth (Patient not taking: Reported on 2/14/2024)  -     Discontinue: Mounjaro 5 MG/0.5ML;  (Patient not taking: Reported on 2/14/2024)  -     Mounjaro 7.5 MG/0.5ML  -     Discontinue: metFORMIN (GLUCOPHAGE) 500 mg tablet  -     metFORMIN (GLUCOPHAGE-XR) 500 mg 24 hr tablet; TAKE 1 TABLET BY ORAL ROUTE TWICE EVERY DAY WITH THE A MEAL         1.  Routine well woman exam done today  2.  Pap:  The patient's pap is not up to date.  Pap was done today.  Current ASCCP Guidelines reviewed.  3.  STD testing was done.  Gonorrhea and chlamydia we run off patient's pap.  4.  Patient has had her Gardasil vaccination.  Recommendations reviewed.  5. The following were reviewed in today's visit: adequate intake of calcium and vitamin D, exercise, and healthy diet.  6. F/u in 1 year for next routine GYN exam..      CC:  Annual Gynecologic Examination    HPI: Suki Castillo is a 23 y.o. G0 who presents for annual gynecologic examination.  She has the following concerns:  no concerns.  Is on mounjaro and metformin and has been losing weight.      Health Maintenance:    Patient describes her health as good.  The last health maintenance visit was 3 years ago.  Patient does have weight concerns.  She exercises  3-4  days per week with cardio: treadmill and elliptical.    She does wear her seatbelt routinely.    She does not perform regular  monthly self breast exams.    She does feels safe at home.   Patients does follow a healthy diet.  Patients gets 1 servings of dairy or calcium rich foods a day.    Last pap: 4 years ago     Patient Active Problem List   Diagnosis    ADHD (attention deficit hyperactivity disorder), inattentive type    Mixed emotional features as adjustment reaction    Anemia    Anxiety state    Recurrent depressive disorder, current episode mild (HCC)    Atypical bipolar affective disorder (HCC)    Mild persistent asthma without complication    Acute recurrent maxillary sinusitis    Chronic fatigue    Wheezing    Immunization due    Healthcare maintenance    Obstructive sleep apnea syndrome    Dermatitis       Past Medical History:   Diagnosis Date    ADHD     Anxiety     Asthma     Depression        Past Surgical History:   Procedure Laterality Date    WISDOM TOOTH EXTRACTION  01/2018       Past OB/Gyn History:  Pt has menstrual issues. Has always had heavy menses  History of sexually transmitted infection: No.  History of abnormal pap smears: No.    Patient is currently sexually active.  heterosexual. The current method of family planning is condoms always.    Family History   Problem Relation Age of Onset    Depression Mother     Depression Maternal Grandmother     Seizures Maternal Grandfather     Leukemia Paternal Grandfather     Diabetes Other        Social History:  Social History     Socioeconomic History    Marital status: Single     Spouse name: Not on file    Number of children: Not on file    Years of education: Not on file    Highest education level: Not on file   Occupational History    Not on file   Tobacco Use    Smoking status: Never    Smokeless tobacco: Never    Tobacco comments:     Passive smoke exposure   Vaping Use    Vaping status: Never Used   Substance and Sexual Activity    Alcohol use: Yes     Comment: soc    Drug use: No    Sexual activity: Yes   Other Topics Concern    Not on file   Social History  Narrative    Not on file     Social Determinants of Health     Financial Resource Strain: Not on file   Food Insecurity: Not on file   Transportation Needs: Not on file   Physical Activity: Not on file   Stress: Not on file   Social Connections: Not on file   Intimate Partner Violence: Not on file   Housing Stability: Not on file     Presently lives with boyfriend.  Patient is currently employed works for [employer] from home; in school for [employer].    Allergies   Allergen Reactions    No Active Allergies     Pollen Extract Other (See Comments)     Stuffy and sneezing         Current Outpatient Medications:     Biotin w/ Vitamins C & E (Hair/Skin/Nails) 1250-7.5-7.5 MCG-MG-UNT CHEW, , Disp: , Rfl:     Melatonin 10 MG TABS, , Disp: , Rfl:     metFORMIN (GLUCOPHAGE-XR) 500 mg 24 hr tablet, TAKE 1 TABLET BY ORAL ROUTE TWICE EVERY DAY WITH THE A MEAL, Disp: , Rfl:     Mounjaro 7.5 MG/0.5ML, , Disp: , Rfl:     VENTOLIN  (90 Base) MCG/ACT inhaler, TAKE 2 PUFFS BY MOUTH EVERY 4 TO 6 HOURS AS NEEDED FOR BREATHING, Disp: , Rfl: 0      Review of Systems  Constitutional :no fever, feels well, no tiredness, recent weight loss  ENT: no ear ache, no loss of hearing, no nosebleeds or nasal discharge, no sore throat or hoarseness.  Cardiovascular: no complaints of slow or fast heart beat, no chest pain, no palpitations, no leg claudication or lower extremity edema.  Respiratory: no complaints of shortness of shortness of breath, no DOLL  Breasts:no complaints of breast pain, breast lump, or nipple discharge  Gastrointestinal: no complaints of abdominal pain, constipation, nausea, vomiting, or diarrhea or bloody stools  Genitourinary : no complaints of dysuria, incontinence, pelvic pain, no dysmenorrhea, vaginal discharge or abnormal vaginal bleeding and as noted in HPI.  Musculoskeletal: no complaints of arthralgia, no myalgia, no joint swelling or stiffness, no limb pain or swelling.  Integumentary: no complaints of  "skin rash or lesion, itching or dry skin  Neurological: no complaints of headache, no confusion, no numbness or tingling, no dizziness or fainting      Physical Exam:   /75   Ht 5' 5\" (1.651 m)   Wt 122 kg (269 lb 1.6 oz)   LMP 01/31/2024   BMI 44.78 kg/m²     General: appears stated age, cooperative, alert normal mood and affect   Psychiatric oriented to person, place and time.  Mood and affect normal   Neck: normal, supple,trachea midline, no masses.  Thyroid: normal, no thyromegaly   Heart: regular rate and rhythm, S1, S2 normal, no murmur, click, rub or gallop   Lungs: clear to auscultation bilaterally, no increased work of breathing or signs of respiratory distress   Breasts: normal, no dimpling or skin changes noted   Abdomen: soft, non-tender, without masses or organomegaly   Vulva: normal , no lesions   Vagina: normal , no lesions or dryness   Urethra: normal   Urethal meatus normal   Bladder Normal, soft, non-tender and no prolapse or masses appreciated   Cervix: normal, no palpable masses    Uterus: normal , non-tender, not enlarged, no palpable masses   Adnexa: normal, non-tender without fullness or masses   Lymphatic Palpation of lymph nodes in neck, axilla, groin and/or other locations: no lymphadenopathy or masses noted   Skin Normal skin turgor and no rashes.  Palpation of skin and subcutaneous tissue normal.      "

## 2024-02-15 LAB
HPV HR 12 DNA CVX QL NAA+PROBE: NEGATIVE
HPV16 DNA CVX QL NAA+PROBE: NEGATIVE
HPV18 DNA CVX QL NAA+PROBE: NEGATIVE

## 2024-02-16 LAB
C TRACH DNA SPEC QL NAA+PROBE: NEGATIVE
N GONORRHOEA DNA SPEC QL NAA+PROBE: NEGATIVE

## 2024-02-19 LAB
LAB AP GYN PRIMARY INTERPRETATION: NORMAL
Lab: NORMAL

## 2024-02-21 PROBLEM — Z00.00 HEALTHCARE MAINTENANCE: Status: RESOLVED | Noted: 2018-12-27 | Resolved: 2024-02-21

## 2024-02-21 PROBLEM — J01.01 ACUTE RECURRENT MAXILLARY SINUSITIS: Status: RESOLVED | Noted: 2018-12-10 | Resolved: 2024-02-21

## 2024-10-25 DIAGNOSIS — Z00.6 ENCOUNTER FOR EXAMINATION FOR NORMAL COMPARISON OR CONTROL IN CLINICAL RESEARCH PROGRAM: ICD-10-CM

## 2025-02-19 ENCOUNTER — ANNUAL EXAM (OUTPATIENT)
Dept: OBGYN CLINIC | Facility: MEDICAL CENTER | Age: 25
End: 2025-02-19
Payer: COMMERCIAL

## 2025-02-19 VITALS
HEIGHT: 65 IN | WEIGHT: 287.8 LBS | SYSTOLIC BLOOD PRESSURE: 115 MMHG | DIASTOLIC BLOOD PRESSURE: 74 MMHG | BODY MASS INDEX: 47.95 KG/M2

## 2025-02-19 DIAGNOSIS — Z01.419 ENCNTR FOR GYN EXAM (GENERAL) (ROUTINE) W/O ABN FINDINGS: Primary | ICD-10-CM

## 2025-02-19 PROBLEM — F32.81 PMDD (PREMENSTRUAL DYSPHORIC DISORDER): Status: ACTIVE | Noted: 2025-02-19

## 2025-02-19 PROCEDURE — 99395 PREV VISIT EST AGE 18-39: CPT | Performed by: OBSTETRICS & GYNECOLOGY

## 2025-02-19 RX ORDER — BUSPIRONE HYDROCHLORIDE 5 MG/1
1 TABLET ORAL 2 TIMES DAILY
COMMUNITY
Start: 2025-02-01

## 2025-02-19 NOTE — PROGRESS NOTES
ASSESSMENT & PLAN: Diagnoses and all orders for this visit:    Encntr for gyn exam (general) (routine) w/o abn findings    Other orders  -     busPIRone (BUSPAR) 5 mg tablet; Take 1 tablet by mouth 2 (two) times a day  -     sertraline (ZOLOFT) 50 mg tablet; TAKE 1 TABLET BY ORAL ROUTE EVERY DAY FOR THE 2 WEEKS BEFORE THE ONSET OF MENSES         1.  Routine well woman exam done today  2.  Pap:  The patient's pap is up to date.  Pap was not done today.  Current ASCCP Guidelines reviewed.  3.  STD testing was not done.  4.  Patient has had her Gardasil vaccination.  Recommendations reviewed.  5. The following were reviewed in today's visit: adequate intake of calcium and vitamin D, exercise, and healthy diet.  6. F/u in 1 year for next routine GYN exam.      CC:  Annual Gynecologic Examination    HPI: Suki Castillo is a 24 y.o. G0 who presents for annual gynecologic examination.  She has the following concerns:  pt reports significant mood changes the week before her menses.  Could have extreme sadness to anger and would resolve with her menses.  Pt was put on Zoloft one week before her period.     Health Maintenance:    Patient describes her health as good.  The last health maintenance visit was 1 years ago.  Patient does have weight concerns.  She hasn't been exercising as much due to work and school.  2 days per week with walking.    She does wear her seatbelt routinely.    She does not perform regular monthly self breast exams.    She does feels safe at home.   Patients does follow a healthy diet.  Patients gets 1 servings of dairy or calcium rich foods a day.    Last pap: 1 year ago     Patient Active Problem List   Diagnosis    ADHD (attention deficit hyperactivity disorder), inattentive type    Mixed emotional features as adjustment reaction    Anemia    Anxiety state    Recurrent depressive disorder, current episode mild (HCC)    Atypical bipolar affective disorder (HCC)    Mild persistent asthma without  complication    Chronic fatigue    Wheezing    Immunization due    Obstructive sleep apnea syndrome    Dermatitis    PMDD (premenstrual dysphoric disorder)       Past Medical History:   Diagnosis Date    ADHD     Anxiety     Asthma     Depression        Past Surgical History:   Procedure Laterality Date    WISDOM TOOTH EXTRACTION  01/2018       Past OB/Gyn History:  Pt has menstrual issues. Has always had heavy menses.  History of sexually transmitted infection: No.  History of abnormal pap smears: No.  Last pap 2024  Patient is currently sexually active.  heterosexual. The current method of family planning is condoms always.    Family History   Problem Relation Age of Onset    Depression Mother     Depression Maternal Grandmother     Seizures Maternal Grandfather     Leukemia Paternal Grandfather     Diabetes Other        Social History:  Social History     Socioeconomic History    Marital status: Single     Spouse name: Not on file    Number of children: Not on file    Years of education: Not on file    Highest education level: Not on file   Occupational History    Not on file   Tobacco Use    Smoking status: Never    Smokeless tobacco: Never    Tobacco comments:     Passive smoke exposure   Vaping Use    Vaping status: Never Used   Substance and Sexual Activity    Alcohol use: Yes     Comment: soc    Drug use: No    Sexual activity: Yes   Other Topics Concern    Not on file   Social History Narrative    Not on file     Social Drivers of Health     Financial Resource Strain: Not on file   Food Insecurity: Not on file   Transportation Needs: Not on file   Physical Activity: Not on file   Stress: Not on file   Social Connections: Not on file   Intimate Partner Violence: Not on file   Housing Stability: Not on file     Presently lives with boyfriend.  Patient is currently employed  [employer] from home; in school for [employer].    Allergies   Allergen Reactions    No Active Allergies     Pollen Extract Other  "(See Comments)     Stuffy and sneezing         Current Outpatient Medications:     busPIRone (BUSPAR) 5 mg tablet, Take 1 tablet by mouth 2 (two) times a day, Disp: , Rfl:     metFORMIN (GLUCOPHAGE-XR) 500 mg 24 hr tablet, TAKE 1 TABLET BY ORAL ROUTE TWICE EVERY DAY WITH THE A MEAL, Disp: , Rfl:     sertraline (ZOLOFT) 50 mg tablet, TAKE 1 TABLET BY ORAL ROUTE EVERY DAY FOR THE 2 WEEKS BEFORE THE ONSET OF MENSES, Disp: , Rfl:     VENTOLIN  (90 Base) MCG/ACT inhaler, TAKE 2 PUFFS BY MOUTH EVERY 4 TO 6 HOURS AS NEEDED FOR BREATHING, Disp: , Rfl: 0      Review of Systems  Constitutional :no fever, feels well, no tiredness, recent weight gain.  Close  ENT: no ear ache, no loss of hearing, no nosebleeds or nasal discharge, no sore throat or hoarseness.  Cardiovascular: no complaints of slow or fast heart beat, no chest pain, no palpitations, no leg claudication or lower extremity edema.  Respiratory: no complaints of shortness of shortness of breath, no DOLL  Breasts:no complaints of breast pain, breast lump, or nipple discharge  Gastrointestinal: no complaints of abdominal pain, constipation, nausea, vomiting, or diarrhea or bloody stools  Genitourinary : no complaints of dysuria, incontinence, pelvic pain, no dysmenorrhea, vaginal discharge or abnormal vaginal bleeding and as noted in HPI.  Musculoskeletal: no complaints of arthralgia, no myalgia, no joint swelling or stiffness, no limb pain or swelling.  Integumentary: no complaints of skin rash or lesion, itching or dry skin  Neurological: no complaints of headache, no confusion, no numbness or tingling, no dizziness or fainting      Physical Exam:   /74   Ht 5' 5\" (1.651 m)   Wt 131 kg (287 lb 12.8 oz)   LMP 01/29/2025   BMI 47.89 kg/m²     General: appears stated age, cooperative, alert normal mood and affect   Psychiatric oriented to person, place and time.  Mood and affect normal   Neck: normal, supple,trachea midline, no masses.  Thyroid: normal, " no thyromegaly   Heart: regular rate and rhythm, S1, S2 normal, no murmur, click, rub or gallop   Lungs: clear to auscultation bilaterally, no increased work of breathing or signs of respiratory distress   Breasts: normal, no dimpling or skin changes noted   Abdomen: soft, non-tender, without masses or organomegaly   Vulva: normal , no lesions   Vagina: normal , no lesions or dryness   Urethra: normal   Urethal meatus normal   Bladder Normal, soft, non-tender and no prolapse or masses appreciated   Cervix: normal, no palpable masses    Uterus: normal , non-tender, not enlarged, no palpable masses   Adnexa: normal, non-tender without fullness or masses   Lymphatic Palpation of lymph nodes in neck, axilla, groin and/or other locations: no lymphadenopathy or masses noted   Skin Normal skin turgor and no rashes.  Palpation of skin and subcutaneous tissue normal.